# Patient Record
Sex: FEMALE | Race: WHITE
[De-identification: names, ages, dates, MRNs, and addresses within clinical notes are randomized per-mention and may not be internally consistent; named-entity substitution may affect disease eponyms.]

---

## 2020-05-20 ENCOUNTER — HOSPITAL ENCOUNTER (EMERGENCY)
Dept: HOSPITAL 7 - FB.ED | Age: 20
Discharge: HOME | End: 2020-05-20
Payer: MEDICAID

## 2020-05-20 DIAGNOSIS — N39.0: ICD-10-CM

## 2020-05-20 DIAGNOSIS — F32.9: ICD-10-CM

## 2020-05-20 DIAGNOSIS — M94.0: Primary | ICD-10-CM

## 2020-05-20 DIAGNOSIS — Z79.899: ICD-10-CM

## 2020-05-20 DIAGNOSIS — L70.1: ICD-10-CM

## 2020-05-20 DIAGNOSIS — F41.9: ICD-10-CM

## 2020-05-20 NOTE — EDM.PDOC
ED HPI GENERAL MEDICAL PROBLEM





- General


Chief Complaint: Chest Pain


Stated Complaint: CHEST PAIN


Time Seen by Provider: 05/20/20 19:20


Source of Information: Reports: Patient


History Limitations: Reports: No Limitations





- History of Present Illness


INITIAL COMMENTS - FREE TEXT/NARRATIVE: 





has had left sided chest wall pain intermittently for the past 2 -3 weeks . 

Unsure of onset . states comes and goes , no particular pattern.


describes it as a sharp pain located in the anterior chest wall


not related to exertion 


does not wake her up at night


may be an hour or 2  after a meal 


no nausea or vomiting with the pain 


no fever or chills , no cough


occasional palpitations, not related to the chest wall pain 





had same pain about one month ago 


resolved with no intervention 


about one month ago she had run out of sertraline and lorazepam that she takes 

for anxiety and depression , was not able to  get refills


moved to [place] from [place] about one month ago





Onset: Today


Duration: Week(s): (3), Getting Worse, Intermittent, Recurring


Location: Reports: Chest, Radiates to (no radiation)


Quality: Reports: Sharp, Stabbing


Severity: Moderate


Improves with: Reports: None


Worsens with: Reports: None


Context: Denies: Activity (no effect on chest wall pain that she has noted)


Associated Symptoms: Reports: Shortness of Breath, Weakness


  ** Chest


Pain Score (Numeric/FACES): 9





- Related Data


 Allergies











Allergy/AdvReac Type Severity Reaction Status Date / Time


 


No Known Allergies Allergy   Verified 05/20/20 18:50











Home Meds: 


 Home Meds





Clindamycin HCl 300 mg PO TID #30 capsule 05/20/20 [Rx]


Clindamycin Phosphate [Clindagel] 1 applic TP BID #75 gel..ml. 05/20/20 [Rx]


Sertraline [Zoloft] 50 mg PO BEDTIME #30 tab 05/20/20 [Rx]











Past Medical History


Cardiovascular History: Reports: Congenital Septal Defect, Heart Murmur


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

, a hole in her heart, a murmer and leaky valves


Gastrointestinal History: Reports: None


Other Genitourinary History: patient states; over active bladder; urinary 

incontience; urinary retention when in public


OB/GYN History: Reports: None


Musculoskeletal History: Reports: None


Neurological History: Reports: None


Psychiatric History: Reports: Anxiety, Depression


Other Psychiatric History: states she has been diagnosed with massive 

depressive disorder and anxiety; was taking sertraline and lorazepam for almost 

a year but stopped taking about 2-3 months ago as she was unable to get refills


Endocrine/Metabolic History: Reports: None


Hematologic History: Reports: None


Immunologic History: Reports: None


Oncologic (Cancer) History: Reports: None


Dermatologic History: Reports: None





- Past Surgical History


Other Cardiovascular Surgeries/Procedures: states they placed a device for the 

hole in her heart but does not think anything was done with the leaky valves as 

it was a "slow leak"





Social & Family History





- Tobacco Use


Smoking Status *Q: Never Smoker





- Caffeine Use


Caffeine Use: Reports: Soda





- Recreational Drug Use


Recreational Drug Use: No





ED ROS GENERAL





- Review of Systems


Review Of Systems: See Below


Constitutional: Denies: Fever, Chills, Malaise, Weakness, Fatigue, Night Sweats

, Diaphoresis, Decreased Appetite, Weight Loss, Weight Gain


HEENT: Reports: No Symptoms


Respiratory: Reports: Shortness of Breath


Cardiovascular: Reports: Palpitations


Endocrine: Reports: No Symptoms


GI/Abdominal: Reports: No Symptoms


: Reports: No Symptoms


Musculoskeletal: Reports: No Symptoms


Skin: Reports: Rash (acneiform rash on the chest and back)


Neurological: Reports: No Symptoms.  Denies: Dizziness, Headache, Difficulty 

Walking, Gait Disturbance


Psychiatric: Reports: Anxiety, Depression


Hematologic/Lymphatic: Reports: No Symptoms


Immunologic: Reports: No Symptoms





ED EXAM, GENERAL





- Physical Exam


Exam: See Below


Exam Limited By: No Limitations


General Appearance: Alert, WD/WN


Eye Exam: Bilateral Eye: Abnormal EOM


Ears: Normal External Exam, Normal Canal


Ear Exam: Bilateral Ear: Auricle Normal, Canal Normal


Nose: Normal Mucosa


Throat/Mouth: Normal Inspection, Normal Oropharynx


Head: Atraumatic, Normocephalic


Neck: Supple, Non-Tender


Respiratory/Chest: Lungs Clear, Normal Breath Sounds, Other (localized 

reproducible chest wall bret non palpation of the chest between 2-5ribs  

parasternally)


Cardiovascular: Normal Peripheral Pulses, Regular Rate, Rhythm


Peripheral Pulses: 2+: Dorsalis Pedis (L), Dorsalis Pedis (R)


GI/Abdominal: Soft, Non-Tender


 (Female) Exam: Deferred


Rectal (Female) Exam: Deferred


Back Exam: Full Range of Motion


Extremities: Normal Range of Motion, No Pedal Edema.  No: Joint Swelling, Leg 

Pain


Neurological: Alert, Oriented, CN II-XII Intact, Normal Gait, Normal Reflexes, 

No Motor/Sensory Deficits


Psychiatric: Anxious


Skin Exam: Warm, Intact





Course





- Vital Signs


Last Recorded V/S: 


 Last Vital Signs











Temp  36.9 C   05/20/20 18:58


 


Pulse  85   05/20/20 18:58


 


Resp  20   05/20/20 18:58


 


BP  129/73   05/20/20 18:58


 


Pulse Ox  98   05/20/20 18:58














- Orders/Labs/Meds


Orders: 


 Active Orders 24 hr











 Category Date Time Status


 


 EKG Documentation Completion [RC] ASDIRECTED Care  05/20/20 19:25 Active


 


 Sodium Chloride 0.9% [Saline Flush] Med  05/20/20 19:42 Active





 10 ml FLUSH ASDIRECTED PRN   


 


 Peripheral IV Insertion Adult [OM.PC] Routine Oth  05/20/20 19:42 Ordered


 


 EKG 12 Lead [EK] Routine Ther  05/20/20 19:25 Ordered








 Medication Orders





Sodium Chloride (Saline Flush)  10 ml FLUSH ASDIRECTED PRN


   PRN Reason: Keep Vein Open


   Last Admin: 05/20/20 19:30  Dose: 10 ml








Labs: 


 Laboratory Tests











  05/20/20 05/20/20 05/20/20 Range/Units





  19:35 19:35 19:35 


 


WBC  7.8    (4.5-12.0)  X10-3/uL


 


RBC  4.35    (3.23-5.20)  x10(6)uL


 


Hgb  11.8    (11.5-15.5)  g/dL


 


Hct  36.0    (30.0-51.3)  %


 


MCV  82.9    (80-96)  fL


 


MCH  27.2 L    (27.7-33.6)  pg


 


MCHC  32.9    (32.2-35.4)  g/dL


 


RDW  13.6    (11.5-15.5)  %


 


Plt Count  315    (125-369)  X10(3)uL


 


MPV  8.0    (7.4-10.4)  fL


 


Neut % (Auto)  56.4    (46-82)  %


 


Lymph % (Auto)  34.8    (13-37)  %


 


Mono % (Auto)  5.2    (4-12)  %


 


Eos % (Auto)  3    (1.0-5.0)  %


 


Baso % (Auto)  0    (0-2)  %


 


Neut # (Auto)  4.5    (1.6-8.3)  #


 


Lymph # (Auto)  2.7    (0.6-5.0)  #


 


Mono # (Auto)  0.4    (0.0-1.3)  #


 


Eos # (Auto)  0.2    (0.0-0.8)  #


 


Baso # (Auto)  0.0    (0.0-0.2)  #


 


Sodium   138   (135-145)  mmol/L


 


Potassium   4.0   (3.5-5.3)  mmol/L


 


Chloride   104   (100-110)  mmol/L


 


Carbon Dioxide   26   (21-32)  mmol/L


 


BUN   21 H   (7-18)  mg/dL


 


Creatinine   0.7   (0.55-1.02)  mg/dL


 


Est Cr Clr Drug Dosing   TNP   


 


Estimated GFR (MDRD)   > 60   (>60)  


 


BUN/Creatinine Ratio   30.0 H   (9-20)  


 


Glucose   85   ()  mg/dL


 


Calcium   9.3   (8.2-10.1)  mg/dL


 


TSH, Ultra Sensitive    1.13  (0.52-4.13)  IU/mL


 


Urine Color     (YELLOW)  


 


Urine Appearance     (CLEAR)  


 


Urine pH     (5.0-6.5)  


 


Ur Specific Gravity     (1.010-1.025)  


 


Urine Protein     (NEGATIVE)  mg/dL


 


Urine Glucose (UA)     (NORMAL)  mg/dL


 


Urine Ketones     (NEGATIVE)  mg/dL


 


Urine Occult Blood     (NEGATIVE)  


 


Urine Nitrite     (NEGATIVE)  


 


Urine Bilirubin     (NEGATIVE)  


 


Urine Urobilinogen     (NEGATIVE)  mg/dL


 


Ur Leukocyte Esterase     (NEGATIVE)  


 


Urine RBC     (0-5)  


 


Urine WBC     (0-5)  


 


Ur Squamous Epith Cells     (NS,R,O)  


 


Urine Bacteria     (NS)  


 


Urine HCG, Qual     (NEGATIVE)  














  05/20/20 05/20/20 Range/Units





  21:00 21:00 


 


WBC    (4.5-12.0)  X10-3/uL


 


RBC    (3.23-5.20)  x10(6)uL


 


Hgb    (11.5-15.5)  g/dL


 


Hct    (30.0-51.3)  %


 


MCV    (80-96)  fL


 


MCH    (27.7-33.6)  pg


 


MCHC    (32.2-35.4)  g/dL


 


RDW    (11.5-15.5)  %


 


Plt Count    (125-369)  X10(3)uL


 


MPV    (7.4-10.4)  fL


 


Neut % (Auto)    (46-82)  %


 


Lymph % (Auto)    (13-37)  %


 


Mono % (Auto)    (4-12)  %


 


Eos % (Auto)    (1.0-5.0)  %


 


Baso % (Auto)    (0-2)  %


 


Neut # (Auto)    (1.6-8.3)  #


 


Lymph # (Auto)    (0.6-5.0)  #


 


Mono # (Auto)    (0.0-1.3)  #


 


Eos # (Auto)    (0.0-0.8)  #


 


Baso # (Auto)    (0.0-0.2)  #


 


Sodium    (135-145)  mmol/L


 


Potassium    (3.5-5.3)  mmol/L


 


Chloride    (100-110)  mmol/L


 


Carbon Dioxide    (21-32)  mmol/L


 


BUN    (7-18)  mg/dL


 


Creatinine    (0.55-1.02)  mg/dL


 


Est Cr Clr Drug Dosing    


 


Estimated GFR (MDRD)    (>60)  


 


BUN/Creatinine Ratio    (9-20)  


 


Glucose    ()  mg/dL


 


Calcium    (8.2-10.1)  mg/dL


 


TSH, Ultra Sensitive    (0.52-4.13)  IU/mL


 


Urine Color  Yellow   (YELLOW)  


 


Urine Appearance  Clear   (CLEAR)  


 


Urine pH  5.0   (5.0-6.5)  


 


Ur Specific Gravity  1.025   (1.010-1.025)  


 


Urine Protein  Negative   (NEGATIVE)  mg/dL


 


Urine Glucose (UA)  Normal   (NORMAL)  mg/dL


 


Urine Ketones  Negative   (NEGATIVE)  mg/dL


 


Urine Occult Blood  Negative   (NEGATIVE)  


 


Urine Nitrite  Negative   (NEGATIVE)  


 


Urine Bilirubin  Negative   (NEGATIVE)  


 


Urine Urobilinogen  Normal   (NEGATIVE)  mg/dL


 


Ur Leukocyte Esterase  Small H   (NEGATIVE)  


 


Urine RBC  0-5   (0-5)  


 


Urine WBC  0-5   (0-5)  


 


Ur Squamous Epith Cells  Many H   (NS,R,O)  


 


Urine Bacteria  Rare H   (NS)  


 


Urine HCG, Qual   Negative  (NEGATIVE)  











Meds: 


Medications











Generic Name Dose Route Start Last Admin





  Trade Name Freq  PRN Reason Stop Dose Admin


 


Sodium Chloride  10 ml  05/20/20 19:42  05/20/20 19:30





  Saline Flush  FLUSH   10 ml





  ASDIRECTED PRN   Administration





  Keep Vein Open   





     





     





     














Discontinued Medications














Generic Name Dose Route Start Last Admin





  Trade Name Arun  PRN Reason Stop Dose Admin


 


Clindamycin HCl  300 mg  05/20/20 19:55  05/20/20 20:05





  Cleocin  PO  05/20/20 19:56  300 mg





  ONETIME ONE   Administration





     





     





     





     


 


Sodium Chloride  1,000 mls @ 999 mls/hr  05/20/20 19:25  05/20/20 19:35





  Normal Saline  IV  05/20/20 20:25  999 mls/hr





  .BOLUS ONE   Administration





     





     





     





     


 


Ketorolac Tromethamine  30 mg  05/20/20 19:54  05/20/20 19:50





  Toradol  IVPUSH  05/20/20 19:55  30 mg





  ONETIME ONE   Administration





     





     





     





     


 


Saccharomyces Boulardii  250 mg  05/20/20 19:55  05/20/20 20:05





  Florastor  PO  05/20/20 19:56  250 mg





  NOW STA   Administration





     





     





     





     


 


Sertraline HCl  50 mg  05/20/20 19:53  05/20/20 20:05





  Zoloft  PO  05/20/20 19:54  50 mg





  ONETIME ONE   Administration





     





     





     





     














- Re-Assessments/Exams


Free Text/Narrative Re-Assessment/Exam: 





05/20/20 21:26


pt had labs done , reviewed 


given sertraline and , fluids and clindamycin 


will need to have FU with PCP 


see  her cardiologist





Departure





- Departure


Time of Disposition: 21:30


Disposition: Home, Self-Care 01


Condition: Fair


Clinical Impression: 


 Costochondritis, acute, Acne comedone, Atypical chest pain, Anxiety and 

depression, UTI (urinary tract infection)





Prescriptions: 


Clindamycin HCl 300 mg PO TID #30 capsule


Clindamycin Phosphate [Clindagel] 1 applic TP BID #75 gel..ml.


Sertraline [Zoloft] 50 mg PO BEDTIME #30 tab


Instructions:  Costochondritis, Easy-to-Read, Coping With Depression, Teen, 

Generalized Anxiety Disorder, Adult, Acne, Easy-to-Read


Referrals: 


PCP,None [Primary Care Provider] - 


Forms:  ED Department Discharge





Sepsis Event Note





- Evaluation


Sepsis Screening Result: No Definite Risk





- Focused Exam


Vital Signs: 


 Vital Signs











  Temp Pulse Resp BP Pulse Ox


 


 05/20/20 18:58  36.9 C  85  20  129/73  98











Date Exam was Performed: 05/20/20


Time Exam was Performed: 21:23





- My Orders


Last 24 Hours: 


My Active Orders





05/20/20 19:25


EKG Documentation Completion [RC] ASDIRECTED 


EKG 12 Lead [EK] Routine 





05/20/20 19:42


Sodium Chloride 0.9% [Saline Flush]   10 ml FLUSH ASDIRECTED PRN 


Peripheral IV Insertion Adult [OM.PC] Routine 














- Assessment/Plan


Last 24 Hours: 


My Active Orders





05/20/20 19:25


EKG Documentation Completion [RC] ASDIRECTED 


EKG 12 Lead [EK] Routine 





05/20/20 19:42


Sodium Chloride 0.9% [Saline Flush]   10 ml FLUSH ASDIRECTED PRN 


Peripheral IV Insertion Adult [OM.PC] Routine

## 2020-06-18 ENCOUNTER — HOSPITAL ENCOUNTER (EMERGENCY)
Dept: HOSPITAL 7 - FB.ED | Age: 20
Discharge: HOME | End: 2020-06-18
Payer: MEDICAID

## 2020-06-18 DIAGNOSIS — M77.9: Primary | ICD-10-CM

## 2020-06-18 DIAGNOSIS — F41.9: ICD-10-CM

## 2020-06-18 DIAGNOSIS — Z79.899: ICD-10-CM

## 2020-06-18 DIAGNOSIS — F32.9: ICD-10-CM

## 2020-06-18 NOTE — EDM.PDOC
ED HPI GENERAL MEDICAL PROBLEM





- General


Chief Complaint: Lower Extremity Injury/Pain


Stated Complaint: ANKLE INJURY


Time Seen by Provider: 06/18/20 20:00


Source of Information: Reports: Patient


History Limitations: Reports: No Limitations





- History of Present Illness


INITIAL COMMENTS - FREE TEXT/NARRATIVE: 





Khalida comes into [facility] ED with a painful R foot over the past few days, 

significance unknown. There is no injury hx, swelling, or discoloration. Pain is

worse with wt bearing, and appeared when wearing BF flip flops earlier this week

to cope with the heat. She has tried no meds. 





- Related Data


                                    Allergies











Allergy/AdvReac Type Severity Reaction Status Date / Time


 


No Known Allergies Allergy   Verified 05/20/20 18:50











Home Meds: 


                                    Home Meds





Clindamycin HCl 300 mg PO TID #30 capsule 05/20/20 [Rx]


Clindamycin Phosphate [Clindagel] 1 applic TP BID #75 gel..ml. 05/20/20 [Rx]


Sertraline [Zoloft] 50 mg PO BEDTIME #30 tab 05/20/20 [Rx]











Past Medical History


Cardiovascular History: Reports: Congenital Septal Defect, Heart Murmur


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 

heart, a hole in her heart, a murmer and leaky valves


Gastrointestinal History: Reports: None


Other Genitourinary History: patient states; over active bladder; urinary 

incontience; urinary retention when in public


OB/GYN History: Reports: None


Musculoskeletal History: Reports: None


Neurological History: Reports: None


Psychiatric History: Reports: Anxiety, Depression


Other Psychiatric History: states she has been diagnosed with massive depressive

 disorder and anxiety; was taking sertraline and lorazepam for almost a year but

 stopped taking about 2-3 months ago as she was unable to get refills


Endocrine/Metabolic History: Reports: None


Hematologic History: Reports: None


Immunologic History: Reports: None


Oncologic (Cancer) History: Reports: None


Dermatologic History: Reports: None





- Past Surgical History


Other Cardiovascular Surgeries/Procedures: states they placed a device for the 

hole in her heart but does not think anything was done with the leaky valves as 

it was a "slow leak"





Social & Family History





- Caffeine Use


Caffeine Use: Reports: Soda





Review of Systems





- Review of Systems


Review Of Systems: Comprehensive ROS is negative, except as noted in HPI.





ED EXAM, GENERAL





- Physical Exam


Exam: See Below


Exam Limited By: No Limitations


General Appearance: Alert, WD/WN, No Apparent Distress


Head: Normocephalic


Neck: Normal Inspection


Respiratory/Chest: Lungs Clear


Cardiovascular: Regular Rate, Rhythm


Back Exam: Normal Inspection


Extremities: Normal Inspection, Normal Range of Motion, No Pedal Edema, Other (R

 ankle: no visible deformity, no swelling or joint effusion; foot: no tenderness

 of the forefoot; there is tenderness of the midfoot at insertion of flexor 

digitorum and near medial malleolus; )


Neurological: Alert, Oriented, CN II-XII Intact, No Motor/Sensory Deficits


Psychiatric: Normal Affect, Anxious


Skin Exam: Warm, Dry, Intact, Normal Color, No Rash


Lymphatic: No Adenopathy





Course





- Vital Signs


Text/Narrative:: 





No meds dispensed during ED visit. 





Departure





- Departure


Time of Disposition: 20:22


Disposition: Home, Self-Care 01


Condition: Fair


Clinical Impression: 


 Tendinitis of right foot








- Discharge Information


*PRESCRIPTION DRUG MONITORING PROGRAM REVIEWED*: Not Applicable


*COPY OF PRESCRIPTION DRUG MONITORING REPORT IN PATIENT ARLENE: Not Applicable


Forms:  ED Department Discharge





- Problem List & Annotations


(1) Tendinitis of right foot


SNOMED Code(s): 144698995


   Code(s): M77.51 - OTHER ENTHESOPATHY OF RIGHT FOOT AND ANKLE   Status: Acute 

  Current Visit: Yes   Annotation/Comment:: Probable tendonitis of flexor 

digitorum right foot. I suggested getting out of the flip flops, wear supported 

wear, NSAIDs for comfort, ice massage.   





- Problem List Review


Problem List Initiated/Reviewed/Updated: Yes





- Assessment/Plan


Plan: 





Follow up with PCP if needed.

## 2020-06-28 ENCOUNTER — HOSPITAL ENCOUNTER (EMERGENCY)
Dept: HOSPITAL 7 - FB.ED | Age: 20
Discharge: HOME | End: 2020-06-28
Payer: MEDICAID

## 2020-06-28 DIAGNOSIS — Z20.828: ICD-10-CM

## 2020-06-28 DIAGNOSIS — B34.9: Primary | ICD-10-CM

## 2020-06-28 PROCEDURE — U0002 COVID-19 LAB TEST NON-CDC: HCPCS

## 2020-06-28 NOTE — EDM.PDOC
ED HPI GENERAL MEDICAL PROBLEM





- General


Chief Complaint: General


Stated Complaint: COVID SYMPTOMS


Time Seen by Provider: 06/28/20 13:12


Source of Information: Reports: Patient


History Limitations: Reports: No Limitations





- History of Present Illness


INITIAL COMMENTS - FREE TEXT/NARRATIVE: 





18 yo female presents requesting a Covid test. Has had mild malaise for a couple

days. Mild anterior chest wall pain. Has a mild HA and sore throat. No definite 

fever. No known exposures. Has no primary care provider. No cough. No GI sx's. 


Onset: Gradual


Onset Date: 06/25/20


Duration: Day(s): (3), Waxing/Waning


Location: Reports: Head, Neck, Chest


Quality: Reports: Dull


Severity: Mild


Improves with: Reports: Medication


Worsens with: Reports: None


Context: Reports: Other (See HPI)


Associated Symptoms: Reports: Chest Pain (ant chest wall), Fever/Chills (no 

fever, chills only), Headaches, Malaise.  Denies: Cough, Diaphoresis, Nausea/

Vomiting, Rash, Shortness of Breath


Treatments PTA: Reports: Other (see below) (none)





- Related Data


                                    Allergies











Allergy/AdvReac Type Severity Reaction Status Date / Time


 


No Known Allergies Allergy   Verified 06/18/20 23:19











Home Meds: 


                                    Home Meds





Multivit-Min/Iron/Folic/Oic488 [Hair, Skin and Nails Tablet] 1 each PO DAILY 

06/18/20 [History]











Past Medical History


Cardiovascular History: Reports: Congenital Septal Defect, Heart Murmur


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 

heart, a hole in her heart, a murmer and leaky valves


Gastrointestinal History: Reports: None


Other Genitourinary History: patient states; over active bladder; urinary 

incontience; urinary retention when in public


OB/GYN History: Reports: None


Musculoskeletal History: Reports: None


Neurological History: Reports: None


Psychiatric History: Reports: Anxiety, Depression


Other Psychiatric History: states she has been diagnosed with massive depressive

 disorder and anxiety; was taking sertraline and lorazepam for almost a year but

 stopped taking about 2-3 months ago as she was unable to get refills


Endocrine/Metabolic History: Reports: None


Hematologic History: Reports: None


Immunologic History: Reports: None


Oncologic (Cancer) History: Reports: None


Dermatologic History: Reports: None





- Past Surgical History


Other Cardiovascular Surgeries/Procedures: states they placed a device for the 

hole in her heart but does not think anything was done with the leaky valves as 

it was a "slow leak"





Social & Family History





- Caffeine Use


Caffeine Use: Reports: Soda





ED ROS GENERAL





- Review of Systems


Review Of Systems: See Below


Constitutional: Reports: Chills, Malaise.  Denies: Fever, Diaphoresis


HEENT: Reports: Throat Pain (mild).  Denies: Ear Pain, Rhinitis, Throat Swelling


Respiratory: Denies: Shortness of Breath, Pleuritic Chest Pain, Cough, Sputum, 

Hemoptysis


Cardiovascular: Reports: No Symptoms


GI/Abdominal: Reports: No Symptoms


: Reports: No Symptoms


Musculoskeletal: Reports: No Symptoms


Skin: Reports: No Symptoms


Neurological: Reports: No Symptoms





ED EXAM, GENERAL





- Physical Exam


Exam: See Below


Exam Limited By: No Limitations


General Appearance: Alert, WD/WN, No Apparent Distress


Eye Exam: Bilateral Eye: Normal Inspection


Ears: Normal External Exam, Normal Canal, Hearing Grossly Normal, Normal TMs


Ear Exam: Bilateral Ear: Auricle Normal, Canal Normal, TM normal


Nose: Normal Inspection, No Blood


Throat/Mouth: Normal Inspection, Normal Lips, Normal Oropharynx, Normal Voice, 

No Airway Compromise


Head: Atraumatic, Normocephalic


Neck: Normal Inspection


Respiratory/Chest: No Respiratory Distress, Lungs Clear, Normal Breath Sounds, 

No Accessory Muscle Use


Cardiovascular: Regular Rate, Rhythm, No Edema


GI/Abdominal: Normal Bowel Sounds, Soft, Non-Tender, No Distention


Back Exam: Normal Inspection.  No: CVA Tenderness (R), CVA Tenderness (L)


Extremities: Normal Inspection


Neurological: Alert, Oriented, CN II-XII Intact, Normal Cognition, No 

Motor/Sensory Deficits


Psychiatric: Normal Affect, Normal Mood


Skin Exam: Warm, Dry, Intact, Normal Color, No Rash





Course





- Orders/Labs/Meds


Orders: 





                               Active Orders 24 hr











 Category Date Time Status


 


 CORONAVIRUS COVID-19, HARSH Routine Lab  06/28/20 13:00 Ordered














Departure





- Departure


Time of Disposition: 13:17


Disposition: Home, Self-Care 01


Condition: Good


Clinical Impression: 


 Viral syndrome








- Discharge Information


*PRESCRIPTION DRUG MONITORING PROGRAM REVIEWED*: No


*COPY OF PRESCRIPTION DRUG MONITORING REPORT IN PATIENT ARLENE: No


Instructions:  Viral Illness, Adult, Hand Washing


Referrals: 


PCP,Unknown [Primary Care Provider] - 


Forms:  ED Department Discharge


Additional Instructions: 


Acetaminophen as needed. Drink ample fluids. Get established with a local 

provider. We will call you with your test results when they are available.





- My Orders


Last 24 Hours: 





My Active Orders





06/28/20 13:00


CORONAVIRUS COVID-19, HARSH Routine 














- Assessment/Plan


Last 24 Hours: 





My Active Orders





06/28/20 13:00


CORONAVIRUS COVID-19, HARSH Routine

## 2021-06-22 ENCOUNTER — HOSPITAL ENCOUNTER (EMERGENCY)
Dept: HOSPITAL 7 - FB.ED | Age: 21
Discharge: HOME | End: 2021-06-22
Payer: MEDICAID

## 2021-06-22 DIAGNOSIS — R07.9: Primary | ICD-10-CM

## 2021-06-22 DIAGNOSIS — R00.2: ICD-10-CM

## 2021-06-22 NOTE — EDM.PDOC
ED HPI GENERAL MEDICAL PROBLEM





- General


Chief Complaint: Chest Pain


Stated Complaint: CHEST PAIN


Time Seen by Provider: 06/22/21 14:05


Source of Information: Reports: Patient


History Limitations: Reports: No Limitations





- History of Present Illness


INITIAL COMMENTS - FREE TEXT/NARRATIVE: 





Patient presented to the ED because of chest pain which started at 1900 while 

resting. The pain is sharp, 8/10 without any associated nausea/vomiting dyspnea.

She has a history of PFO which was repaired and VHD as well as a murmur.


  ** Chest


Pain Score (Numeric/FACES): 8





- Related Data


                                    Allergies











Allergy/AdvReac Type Severity Reaction Status Date / Time


 


No Known Allergies Allergy   Verified 06/22/21 14:01











Home Meds: 


                                    Home Meds





Multivit-Min/Iron/Folic/Vfa540 [Hair, Skin and Nails Tablet] 1 each PO DAILY 

06/18/20 [History]


Metoprolol Tartrate 25 mg PO DAILY PRN #30 tablet 06/22/21 [Rx]











Past Medical History


Cardiovascular History: Reports: Congenital Septal Defect, Heart Murmur


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 

heart, a hole in her heart, a murmer and leaky valves


Gastrointestinal History: Reports: None


Other Genitourinary History: patient states; over active bladder; urinary 

incontience; urinary retention when in public


OB/GYN History: Reports: None


Musculoskeletal History: Reports: None


Neurological History: Reports: None


Psychiatric History: Reports: Anxiety, Depression


Other Psychiatric History: states she has been diagnosed with massive depressive

 disorder and anxiety; was taking sertraline and lorazepam for almost a year but

 stopped taking about 2-3 months ago as she was unable to get refills


Endocrine/Metabolic History: Reports: None


Hematologic History: Reports: None


Immunologic History: Reports: None


Oncologic (Cancer) History: Reports: None


Dermatologic History: Reports: None





- Past Surgical History


Other Cardiovascular Surgeries/Procedures: states they placed a device for the 

hole in her heart but does not think anything was done with the leaky valves as 

it was a "slow leak"





Social & Family History





- Family History


Family Medical History: No Pertinent Family History





- Tobacco Use


Tobacco Use Status *Q: Unknown Ever Used Tobacco





- Caffeine Use


Caffeine Use: Reports: Tea





ED ROS GENERAL





- Review of Systems


Review Of Systems: See Below


Constitutional: Reports: No Symptoms


HEENT: Reports: No Symptoms


Respiratory: Reports: No Symptoms


Cardiovascular: Reports: Chest Pain


Endocrine: Reports: No Symptoms


GI/Abdominal: Reports: No Symptoms


: Reports: No Symptoms


Musculoskeletal: Reports: No Symptoms


Skin: Reports: No Symptoms


Neurological: Reports: No Symptoms





ED EXAM, GENERAL





- Physical Exam


Exam: See Below


Exam Limited By: No Limitations


General Appearance: Alert, No Apparent Distress


Eye Exam: Bilateral Eye: PERRL


Ears: Normal External Exam, Normal Canal


Nose: Normal Inspection, Normal Mucosa, No Blood


Throat/Mouth: Normal Inspection, Normal Lips, Normal Teeth


Head: Atraumatic, Normocephalic


Neck: Normal Inspection, Supple, Non-Tender, Full Range of Motion


Respiratory/Chest: No Respiratory Distress, Lungs Clear, Normal Breath Sounds


Cardiovascular: Normal Peripheral Pulses, Regular Rate, Rhythm, No Edema


GI/Abdominal: Normal Bowel Sounds, Soft, Non-Tender, No Organomegaly


Back Exam: Normal Inspection, Full Range of Motion


  ** #1 Interpretation


EKG Date: 06/22/21


Time: 13:38


Rhythm: NSR


Rate (Beats/Min): 90


Axis: Normal


P-Wave: Present


QRS: Normal


ST-T: Normal


QT: Normal


Comparison: NA - No Prior EKG


EKG Interpretation Comments: 





NSR





Course





- Vital Signs


Text/Narrative:: 





Lab/EKG result was reviewed and discussed with patient


 mg PO x1


Morphine 2 mg IV x1


Metoprolol tartrate 25 mg PO x1


Last Recorded V/S: 


                                Last Vital Signs











Temp  36.7 C   06/22/21 14:03


 


Pulse  85   06/22/21 14:30


 


Resp  18   06/22/21 14:03


 


BP  139/75   06/22/21 14:30


 


Pulse Ox  99   06/22/21 14:03














- Orders/Labs/Meds


Orders: 


                               Active Orders 24 hr











 Category Date Time Status


 


 EKG Documentation Completion [RC] ASDIRECTED Care  06/22/21 13:40 Active


 


 EKG 12 Lead [EK] Routine Ther  06/22/21 13:40 Ordered











Labs: 


                                Laboratory Tests











  06/22/21 06/22/21 06/22/21 Range/Units





  14:20 14:20 14:20 


 


WBC  7.3    (3.0-10.3)  x10-3/uL


 


RBC  4.26    (3.60-5.20)  x10(6)uL


 


Hgb  12.3    (11.4-15.5)  g/dL


 


Hct  35.9    (34.2-48.2)  %


 


MCV  84.3    (76.7-100.5)  fL


 


MCH  28.8    (23.9-33.9)  pg


 


MCHC  34.2    (31.9-34.8)  g/dL


 


RDW  14.9    (12.3-16.5)  %


 


Plt Count  276    (151-488)  x10(3)uL


 


MPV  8.0    (7.1-12.4)  fL


 


Neut % (Auto)  68.7    (30.8-76.2)  %


 


Lymph % (Auto)  24.7    (18.4-52.1)  %


 


Mono % (Auto)  4.7    (4.4-15.7)  %


 


Eos % (Auto)  1.6    (0.6-8.1)  %


 


Baso % (Auto)  0.3    (0.2-1.5)  %


 


Neut # (Auto)  5.0    (1.5-6.3)  x10-3/uL


 


Lymph # (Auto)  1.8    (1.0-4.4)  x10-3/uL


 


Mono # (Auto)  0.3    (0.3-1.0)  x10-3/uL


 


Eos # (Auto)  0.1    (0.0-0.8)  x10-3/uL


 


Baso # (Auto)  0.0    (0.0-0.1)  x10-3/uL


 


D-Dimer, Quantitative   0.28   (0.0-0.59)  mg/LFEU


 


Sodium    143  (135-145)  mmol/L


 


Potassium    4.0  (3.5-5.3)  mmol/L


 


Chloride    108  (100-110)  mmol/L


 


Carbon Dioxide    25  (21-32)  mmol/L


 


BUN    10  D  (7-18)  mg/dL


 


Creatinine    0.8  (0.55-1.02)  mg/dL


 


Est Cr Clr Drug Dosing    96.86  mL/min


 


Estimated GFR (MDRD)    > 60  (>60)  


 


BUN/Creatinine Ratio    12.5  (9-20)  


 


Glucose    112  ()  mg/dL


 


Calcium    9.1  (8.6-10.2)  mg/dL


 


Troponin I     (4.0-60.3)  pg/mL














  06/22/21 Range/Units





  14:20 


 


WBC   (3.0-10.3)  x10-3/uL


 


RBC   (3.60-5.20)  x10(6)uL


 


Hgb   (11.4-15.5)  g/dL


 


Hct   (34.2-48.2)  %


 


MCV   (76.7-100.5)  fL


 


MCH   (23.9-33.9)  pg


 


MCHC   (31.9-34.8)  g/dL


 


RDW   (12.3-16.5)  %


 


Plt Count   (151-488)  x10(3)uL


 


MPV   (7.1-12.4)  fL


 


Neut % (Auto)   (30.8-76.2)  %


 


Lymph % (Auto)   (18.4-52.1)  %


 


Mono % (Auto)   (4.4-15.7)  %


 


Eos % (Auto)   (0.6-8.1)  %


 


Baso % (Auto)   (0.2-1.5)  %


 


Neut # (Auto)   (1.5-6.3)  x10-3/uL


 


Lymph # (Auto)   (1.0-4.4)  x10-3/uL


 


Mono # (Auto)   (0.3-1.0)  x10-3/uL


 


Eos # (Auto)   (0.0-0.8)  x10-3/uL


 


Baso # (Auto)   (0.0-0.1)  x10-3/uL


 


D-Dimer, Quantitative   (0.0-0.59)  mg/LFEU


 


Sodium   (135-145)  mmol/L


 


Potassium   (3.5-5.3)  mmol/L


 


Chloride   (100-110)  mmol/L


 


Carbon Dioxide   (21-32)  mmol/L


 


BUN   (7-18)  mg/dL


 


Creatinine   (0.55-1.02)  mg/dL


 


Est Cr Clr Drug Dosing   mL/min


 


Estimated GFR (MDRD)   (>60)  


 


BUN/Creatinine Ratio   (9-20)  


 


Glucose   ()  mg/dL


 


Calcium   (8.6-10.2)  mg/dL


 


Troponin I  9.9  (4.0-60.3)  pg/mL











Meds: 


Medications














Discontinued Medications














Generic Name Dose Route Start Last Admin





  Trade Name Freq  PRN Reason Stop Dose Admin


 


Aspirin  324 mg  06/22/21 13:55  06/22/21 13:55





  Aspirin 81 Mg Tab.Chew  PO  06/22/21 13:56  324 mg





  ONETIME ONE   Administration


 


Metoprolol Tartrate  25 mg  06/22/21 14:11  06/22/21 14:30





  Metoprolol Tartrate 25 Mg Tab  PO  06/22/21 14:12  25 mg





  ONETIME ONE   Administration


 


Morphine Sulfate  2 mg  06/22/21 14:11  06/22/21 14:26





  Morphine 2 Mg/Ml Syringe  IVPUSH  06/22/21 14:12  2 mg





  NOW STA   Administration














Departure





- Departure


Time of Disposition: 15:00


Disposition: Home, Self-Care 01


Condition: Good


Clinical Impression: 


 Chest pain, Palpitations





Prescriptions: 


Metoprolol Tartrate 25 mg PO DAILY PRN #30 tablet


 PRN Reason: palpitations


Instructions:  Chest Wall Pain, Easy-to-Read, Palpitations, Easy-to-Read


Referrals: 


Amelia Garrett NP [Primary Care Provider] - 


Forms:  ED Department Discharge


Additional Instructions: 


Please read discharge instructions on chest pain and palpitations


Take metoprolol tartrate 25 mg 1-2 tablets daily as needed for palpitations


Follow up as need





Sepsis Event Note (ED)





- Evaluation


Sepsis Screening Result: No Definite Risk





- My Orders


Last 24 Hours: 


My Active Orders





06/22/21 13:40


EKG Documentation Completion [RC] ASDIRECTED 


EKG 12 Lead [EK] Routine 














- Assessment/Plan


Last 24 Hours: 


My Active Orders





06/22/21 13:40


EKG Documentation Completion [RC] ASDIRECTED 


EKG 12 Lead [EK] Routine

## 2021-09-24 ENCOUNTER — HOSPITAL ENCOUNTER (EMERGENCY)
Dept: HOSPITAL 7 - FB.ED | Age: 21
Discharge: HOME | End: 2021-09-24
Payer: MEDICAID

## 2021-09-24 DIAGNOSIS — Z79.899: ICD-10-CM

## 2021-09-24 DIAGNOSIS — K21.9: Primary | ICD-10-CM

## 2021-09-24 PROCEDURE — 99285 EMERGENCY DEPT VISIT HI MDM: CPT

## 2021-09-24 PROCEDURE — 36415 COLL VENOUS BLD VENIPUNCTURE: CPT

## 2021-09-24 PROCEDURE — 84484 ASSAY OF TROPONIN QUANT: CPT

## 2021-09-24 PROCEDURE — 93005 ELECTROCARDIOGRAM TRACING: CPT

## 2021-09-24 NOTE — EDM.PDOC
ED HPI GENERAL MEDICAL PROBLEM





- General


Stated Complaint: CHEST PAIN


Time Seen by Provider: 09/24/21 20:10


Source of Information: Reports: Patient, EMS


History Limitations: Reports: No Limitations





- History of Present Illness


INITIAL COMMENTS - FREE TEXT/NARRATIVE: 





c/o sscp





pt ate fast food at home, 30 minute later developed burning in mid chest, took 

no meds at home, called EMS who brought her here





says she had a PDA that was closed with a mechanical device at age 5, says she 

"has 5 heart conditions"





labs (CBC/CMP/trop/ddimer) were all neg here this summer





no sob, no f/c/d





lives with wendy and 5 mo child (who is in good health)





- Related Data


                                    Allergies











Allergy/AdvReac Type Severity Reaction Status Date / Time


 


No Known Allergies Allergy   Verified 06/22/21 14:01











Home Meds: 


                                    Home Meds





Multivit-Min/Iron/Folic/Nvo200 [Hair, Skin and Nails Tablet] 1 each PO DAILY 

06/18/20 [History]


Metoprolol Tartrate 25 mg PO DAILY PRN #30 tablet 06/22/21 [Rx]


Omeprazole 20 mg PO DAILY #10 tablet. 09/24/21 [Rx]











Past Medical History


Cardiovascular History: Reports: Congenital Septal Defect, Heart Murmur


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 

heart, a hole in her heart, a murmer and leaky valves


Gastrointestinal History: Reports: None


Other Genitourinary History: patient states; over active bladder; urinary 

incontience; urinary retention when in public


OB/GYN History: Reports: None


Musculoskeletal History: Reports: None


Neurological History: Reports: None


Psychiatric History: Reports: Anxiety, Depression


Other Psychiatric History: states she has been diagnosed with massive depressive

 disorder and anxiety; was taking sertraline and lorazepam for almost a year but

 stopped taking about 2-3 months ago as she was unable to get refills


Endocrine/Metabolic History: Reports: None


Hematologic History: Reports: None


Immunologic History: Reports: None


Oncologic (Cancer) History: Reports: None


Dermatologic History: Reports: None





- Past Surgical History


Other Cardiovascular Surgeries/Procedures: states they placed a device for the 

hole in her heart but does not think anything was done with the leaky valves as 

it was a "slow leak"





Social & Family History





- Family History


Family Medical History: No Pertinent Family History





- Caffeine Use


Caffeine Use: Reports: Tea





ED ROS GENERAL





- Review of Systems


Review Of Systems: See Below


Constitutional: Reports: No Symptoms.  Denies: Fever


HEENT: Reports: No Symptoms


Respiratory: Reports: No Symptoms.  Denies: Shortness of Breath, Wheezing, Cough


Cardiovascular: Reports: Chest Pain


Endocrine: Reports: No Symptoms


GI/Abdominal: Reports: No Symptoms.  Denies: Nausea, Vomiting


: Reports: No Symptoms


Musculoskeletal: Reports: No Symptoms


Skin: Reports: No Symptoms


Neurological: Reports: No Symptoms


Psychiatric: Reports: No Symptoms


Hematologic/Lymphatic: Reports: No Symptoms


Immunologic: Reports: No Symptoms





ED EXAM, GI/ABD





- Physical Exam


Exam: See Below


Exam Limited By: No Limitations


General Appearance: Alert, WD/WN, No Apparent Distress, Other (alert, pleasant, 

walked into ED with paramedic, NAD)


Nose: Normal Inspection


Throat/Mouth: Normal Inspection


Head: Atraumatic, Normocephalic


Neck: Normal Inspection, Supple, Non-Tender, Full Range of Motion.  No: 

Lymphadenopathy (R), Lymphadenopathy (L)


Respiratory/Chest: No Respiratory Distress, Lungs Clear, Normal Breath Sounds, 

No Accessory Muscle Use, Chest Non-Tender, Other (ribs NT)


Cardiovascular: Regular Rate, Rhythm, No Edema, No Gallop, No Murmur, No Rub


GI/Abdominal Exam: Soft, Non-Tender, No Distention


Back Exam: Normal Inspection, Full Range of Motion


Extremities: Normal Inspection, Non-Tender, No Pedal Edema


Neurological: Alert, Oriented, CN II-XII Intact, Normal Cognition, Normal Gait, 

No Motor/Sensory Deficits


Psychiatric: Normal Affect, Normal Mood


Skin Exam: Warm, Dry, Intact, Normal Color, No Rash


Lymphatic: No Adenopathy





Course





- Orders/Labs/Meds


Orders: 


                               Active Orders 24 hr











 Category Date Time Status


 


 Pantoprazole [ProTONIX***] Med  09/24/21 22:13 Once





 40 mg PO NOW ONE   











Labs: 


                                Laboratory Tests











  09/24/21 Range/Units





  20:40 


 


Troponin I  6.6  (4.0-60.3)  pg/mL











Meds: 


Medications














Discontinued Medications














Generic Name Dose Route Start Last Admin





  Trade Name Freq  PRN Reason Stop Dose Admin


 


Al Hydroxide/Mg Hydroxide 30  0 ml  09/24/21 20:30  09/24/21 20:47





  ml/ Lidocaine HCl 15 ml  PO  09/24/21 20:31  45 ml





  ONETIME ONE   Administration














- Re-Assessments/Exams


Free Text/Narrative Re-Assessment/Exam: 





09/24/21 22:18


trop neg, EKG neg





cp resolved completely after GI cocktail





last saw cardiology 2m ago and was doing well, no clinical evidence of CV 

concerns





Departure





- Departure


Time of Disposition: 22:13


Disposition: Home, Self-Care 01


Condition: Good


Clinical Impression: 


 GERD (gastroesophageal reflux disease)








- Discharge Information


*PRESCRIPTION DRUG MONITORING PROGRAM REVIEWED*: Not Applicable


*COPY OF PRESCRIPTION DRUG MONITORING REPORT IN PATIENT ARLENE: Not Applicable


Prescriptions: 


Omeprazole 20 mg PO DAILY #10 tablet.


Instructions:  Gastroesophageal Reflux Disease, Adult


Additional Instructions: 


To decrease acid production, take omeprazole 20 mg 1 capsule daily for 7 days.





To neutralize acid, take liquid antacid 30 ml (2 tablespoons) every 4 hours as 

needed.





Avoid fatty foods for next several days.





Your heart tests are within normal limits.





See your primary care provider in the next several days for further 

recommendations.





- My Orders


Last 24 Hours: 


My Active Orders





09/24/21 22:13


Pantoprazole [ProTONIX***]   40 mg PO NOW ONE 














- Assessment/Plan


Last 24 Hours: 


My Active Orders





09/24/21 22:13


Pantoprazole [ProTONIX***]   40 mg PO NOW ONE

## 2021-09-30 ENCOUNTER — HOSPITAL ENCOUNTER (EMERGENCY)
Dept: HOSPITAL 7 - FB.ED | Age: 21
Discharge: HOME | End: 2021-09-30
Payer: MEDICAID

## 2021-09-30 DIAGNOSIS — S39.012A: Primary | ICD-10-CM

## 2021-09-30 DIAGNOSIS — Z79.899: ICD-10-CM
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
## 2021-09-30 PROCEDURE — 96372 THER/PROPH/DIAG INJ SC/IM: CPT

## 2021-09-30 PROCEDURE — 99283 EMERGENCY DEPT VISIT LOW MDM: CPT

## 2021-09-30 RX ADMIN — KETOROLAC TROMETHAMINE STA MG: 30 INJECTION, SOLUTION INTRAMUSCULAR at 18:19
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
